# Patient Record
(demographics unavailable — no encounter records)

---

## 2025-05-27 NOTE — DATA REVIEWED
[FreeTextEntry1] : 5/8/25 ([facility]) b/l dx mammogram & left breast US: scattered areas of fibroglandular density, left breast 12:00 retroareolar region, 6.2cm mass with partially obscured margins is noted in area of palpable concern, US guided biopsy recommended. Two adjacent circumscribed masses measuring 0.8cm and 0.6cm respectively are noted in the 1-2:00 axis of the left breast. Left 1-2:00 8-9cmFN demonstrates a 1.0cm mildly prominent intramammary lymph node, an additional 0.7cm mildly prominent intramammary lymph node is noted in 1-2:00 11cmFN left breast, US guided biopsy of the larger lymph node is recommended. Mild skin thickening and associated edema noted left breast/left intramammary fold 6:00, clinical evaluation recommended. BIRADS 4  5/13/25 ([facility]) left breast 12:00 retroareolar US bx (hourglass clip): invasive ductal carcinoma, poorly differentiated, IHC PENDING left breast 102:00 US bx 1.0cm mass (top hat clip): benign lymph node with reactive changes

## 2025-05-27 NOTE — PHYSICAL EXAM
[Normocephalic] : normocephalic [Atraumatic] : atraumatic [Supple] : supple [No Supraclavicular Adenopathy] : no supraclavicular adenopathy [Examined in the supine and seated position] : examined in the supine and seated position [No dominant masses] : no dominant masses in right breast  [No Nipple Retraction] : no left nipple retraction [No Nipple Discharge] : no left nipple discharge [No Axillary Lymphadenopathy] : no left axillary lymphadenopathy [No Edema] : no edema [No Rashes] : no rashes [No Ulceration] : no ulceration [de-identified] : Visibly significant and palpable mobile mass in the left breast measuring 8 cm x 8 cm in the retroareolar region spanning 12-3 o'clock, does not appear adherent to the underlying skin.

## 2025-05-27 NOTE — ASSESSMENT
[FreeTextEntry1] : 58-year-old female with a newly diagnosed left breast invasive ductal carcinoma, biomarkers are pending and family history of breast cancer

## 2025-05-27 NOTE — PLAN
[TextEntry] : Bilateral breast MRI Follow-up results of genetic testing Patient to follow-up after imaging completed for surgical planning versus treatment planning

## 2025-05-27 NOTE — HISTORY OF PRESENT ILLNESS
[FreeTextEntry1] : BOAZ is a 58 year old female, referred by Dr. Enamorado, who presents with her  for initial evaluation regarding newly diagnosed left breast IDC, poorly differentiated, IHC PENDING.  The patient initially noted a palpable mass in the left breast around February and March of this year.  She initially attributed to scar tissue from her bilateral breast reduction which was performed in September 2024.  She did notice that it increased in size significantly since it was first palpated.  She reached out to Dr. Enamorado's office and underwent a bilateral diagnostic mammogram & targeted left breast US on 5/8/2025.  The imaging showed left breast 12:00 retroareolar region (corresponding to palpable concern) a 6.2cm mass s/p US core biopsy on 5/13/25 yielding malignant concordant findings. There were additionally noted mildly prominent intramammary lymph nodes in the left breast 1-2 o'clock measuring 1.0 cm for which ultrasound-guided biopsy of the larger of the 2 lymph nodes was recommended. The patient underwent a left breast ultrasound core biopsy of the intramammary lymph node yielding benign concordant findings. Additionally there was mild skin thickening and associated edema noted in the left breast/left inframammary fold 6:00 axis for which clinical evaluation is recommended. Consideration of skin punch biopsy recommendation was made.  I spoke to the patient and her significant other at length.  I did discuss that we would await the results of the biomarkers before considering a treatment plan.  I recommended bilateral breast MRI to further evaluate the extent of disease, the contralateral breast as well as bilateral axilla.  I also recommended genetic testing due to the family history of breast cancer and the new diagnosis.  The patient and her  are amenable and agree.  On physical exam today there was a visibly significant palpable mass in the left breast.  The overlying skin did not appear edematous or thickened.  The patient is considering breast conservation if possible.  We will discuss this further after imaging and genetic testing is completed.  Family Hx: The patient has a family history of breast cancer in her grandmother around the age of 30.